# Patient Record
Sex: MALE | NOT HISPANIC OR LATINO
[De-identification: names, ages, dates, MRNs, and addresses within clinical notes are randomized per-mention and may not be internally consistent; named-entity substitution may affect disease eponyms.]

---
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
## 2021-04-13 ENCOUNTER — APPOINTMENT (OUTPATIENT)
Dept: PEDIATRIC UROLOGY | Facility: CLINIC | Age: 21
End: 2021-04-13
Payer: MEDICAID

## 2021-04-13 VITALS — HEIGHT: 70 IN | TEMPERATURE: 98.5 F | WEIGHT: 130 LBS | BODY MASS INDEX: 18.61 KG/M2

## 2021-04-13 DIAGNOSIS — N50.3 CYST OF EPIDIDYMIS: ICD-10-CM

## 2021-04-13 DIAGNOSIS — I86.1 SCROTAL VARICES: ICD-10-CM

## 2021-04-13 PROCEDURE — 76870 US EXAM SCROTUM: CPT

## 2021-04-13 PROCEDURE — 93976 VASCULAR STUDY: CPT

## 2021-04-13 PROCEDURE — 99204 OFFICE O/P NEW MOD 45 MIN: CPT

## 2021-06-14 PROBLEM — N50.3 CYST OF EPIDIDYMIS: Status: ACTIVE | Noted: 2021-06-14

## 2021-06-14 NOTE — PHYSICAL EXAM
[Well developed] : well developed [Well nourished] : well nourished [Well appearing] : well appearing [Deferred] : deferred [Acute distress] : no acute distress [Dysmorphic] : no dysmorphic [Abnormal shape] : no abnormal shape [Ear anomaly] : no ear anomaly [Abnormal nose shape] : no abnormal nose shape [Nasal discharge] : no nasal discharge [Mouth lesions] : no mouth lesions [Eye discharge] : no eye discharge [Labored breathing] : non- labored breathing [Icteric sclera] : no icteric sclera [Mass] : no mass [Rigid] : not rigid [Splenomegaly] : no splenomegaly [Hepatomegaly] : no hepatomegaly [Palpable bladder] : no palpable bladder [RUQ Tenderness] : no ruq tenderness [LUQ Tenderness] : no luq tenderness [RLQ Tenderness] : no rlq tenderness [LLQ Tenderness] : no llq tenderness [Right tenderness] : no right tenderness [Left tenderness] : no left tenderness [Renomegaly] : no renomegaly [Right-side mass] : no right-side mass [Left-side mass] : no left-side mass [Dimple] : no dimple [Hair Tuft] : no hair tuft [Limited limb movement] : no limited limb movement [Edema] : no edema [Rashes] : no rashes [Ulcers] : no ulcers [Abnormal turgor] : normal turgor [TextBox_92] : GENITAL EXAM:\par \par PENIS: Normal.  No signs of infection.\par TESTICLES: Bilateral testicles palpable in the dependent position of the scrotum, vertical lie, do not retract, without any masses, induration or tenderness, and approximately normal symmetric and firm consistency. Left testicle smaller than right testicle. \par SCROTAL/INGUINAL: No palpable inguinal hernias, hydroceles or varicoceles with and without Valsalva maneuvers.\par

## 2021-06-14 NOTE — ASSESSMENT
[FreeTextEntry1] : History of left varicocele.  Patient with mild left varicocele on exam, which was confirmed on today's scrotal ultrasound as well as left testicle smaller than right testicle and nonpalpable right epididymal cyst.  I discussed potential implications including affect on testicle and fertility. I discussed options regarding the varicocele with the patient and parent, including semen analysis, monitoring and surgical intervention.  They decided upon monitoring with followup in 6 months for exam and ultrasound.  We also discussed options for the epididymal cyst including monitoring and marker determination and  they decided upon monitoring alone.  Continue monthly self-exam.  Follow-up sooner if any interval urologic issues and/or changes. Parent stated that all explanations understood, and all questions were answered and to their satisfaction.

## 2021-06-14 NOTE — REASON FOR VISIT
[Initial Consultation] : an initial consultation [TextBox_50] : varicocele [TextBox_8] : Dr. Juan Francisco Mo

## 2021-06-14 NOTE — HISTORY OF PRESENT ILLNESS
[TextBox_4] : History obtained from parent and patient.\par \par History of a varicocele noted by patient on self-exam 2 years ago. No change in size. Asymptomatic.  No associated signs or symptoms.  No aggravating or relieving factors.  Mild to moderate severity.  Insidious onset.  No previous treatment.  No current treatment.  No history of UTIs, genital infections or other urologic issues.  No pertinent radiographic imaging.\par \par

## 2022-04-12 ENCOUNTER — APPOINTMENT (OUTPATIENT)
Dept: PEDIATRIC UROLOGY | Facility: CLINIC | Age: 22
End: 2022-04-12

## 2022-05-17 ENCOUNTER — APPOINTMENT (OUTPATIENT)
Dept: PEDIATRIC UROLOGY | Facility: CLINIC | Age: 22
End: 2022-05-17